# Patient Record
Sex: FEMALE | Race: WHITE
[De-identification: names, ages, dates, MRNs, and addresses within clinical notes are randomized per-mention and may not be internally consistent; named-entity substitution may affect disease eponyms.]

---

## 2020-06-28 ENCOUNTER — HOSPITAL ENCOUNTER (EMERGENCY)
Dept: HOSPITAL 11 - JP.ED | Age: 40
Discharge: HOME | End: 2020-06-28
Payer: SELF-PAY

## 2020-06-28 DIAGNOSIS — Z88.5: ICD-10-CM

## 2020-06-28 DIAGNOSIS — F41.9: Primary | ICD-10-CM

## 2020-06-28 DIAGNOSIS — F17.210: ICD-10-CM

## 2020-06-28 DIAGNOSIS — Z79.899: ICD-10-CM

## 2020-06-28 NOTE — EDM.PDOC
ED HPI GENERAL MEDICAL PROBLEM





- General


Chief Complaint: General


Stated Complaint: PANIC ATTACKS


Time Seen by Provider: 06/28/20 14:21


Source of Information: Reports: Patient


History Limitations: Reports: No Limitations





- History of Present Illness


INITIAL COMMENTS - FREE TEXT/NARRATIVE: 





This patient who has a history of panic attacks and had a full work up in [facility] in the recent past presents to the ER with a panicky feeling, throat 

constriction, dry mouth and "rushes" since 0930 today.  She had similar attacks 

in the past and treated them with breathing exercises and prn hydroxizine.  She 

ran out of this medication and is in requesting more.  She denies fever, chills,

chest pain, dyspnea or any new neuro symptoms.  She and her family recently 

moved here from [place], [place] and has not found a primary care doctor in the 

area.  


  ** Chest


Pain Score (Numeric/FACES): 5





- Related Data


                                    Allergies











Allergy/AdvReac Type Severity Reaction Status Date / Time


 


hydromorphone [From Dilaudid] Allergy  Shortness Verified 06/28/20 14:13





   of Breath  











Home Meds: 


                                    Home Meds





hydrOXYzine HCL [hydrOXYzine] 25 mg PO Q6H PRN 06/28/20 [History]











Past Medical History


Psychiatric History: Reports: Anxiety





- Past Surgical History


HEENT Surgical History: Reports: Tonsillectomy





Social & Family History





- Tobacco Use


Smoking Status *Q: Light Tobacco Smoker


Years of Tobacco use: 29


Packs/Tins Daily: 0.1





- Caffeine Use


Caffeine Use: Reports: None





- Recreational Drug Use


Recreational Drug Use: No





ED ROS GENERAL





- Review of Systems


Review Of Systems: See Below


Constitutional: Denies: Fever, Chills, Weakness, Fatigue, Diaphoresis


HEENT: Reports: Other (throat constriction)


Respiratory: Denies: Shortness of Breath, Wheezing, Cough


Cardiovascular: Denies: Chest Pain, Dyspnea on Exertion, Palpitations


Endocrine: Denies: Polydypsia, Polyuria


GI/Abdominal: Reports: No Symptoms


Neurological: Denies: Confusion, Dizziness, Paresthesia, Syncope


Psychiatric: Reports: Anxiety.  Denies: Homicidal Ideation, Suicidal Ideation


Hematologic/Lymphatic: Reports: No Symptoms





ED EXAM, GENERAL





- Physical Exam


Exam: See Below


Exam Limited By: No Limitations


General Appearance: Alert, WD/WN, No Apparent Distress, Anxious


Ears: Normal External Exam, Normal Canal


Throat/Mouth: Normal Inspection, Normal Lips


Head: Normocephalic


Respiratory/Chest: No Respiratory Distress, Lungs Clear, Normal Breath Sounds


Cardiovascular: Normal Peripheral Pulses, Regular Rate, Rhythm, No Edema


GI/Abdominal: Normal Bowel Sounds, Soft, Non-Tender


Extremities: Normal Inspection, Normal Range of Motion


Neurological: Alert, Oriented, Normal Cognition, No Motor/Sensory Deficits


Psychiatric: Normal Affect, Anxious


Skin Exam: Warm, Dry





Course





- Vital Signs


Text/Narrative:: 





This patient presents with a panic attack.  She had similar problems in the past

 that respond to hydroxizine which she ran out of .  She has not established 

care in this area after recently moving to [place] from [place], [place].  She 

was given an Rx for hydroxyzine 25 mg every 6 hours as needed.  She will make an

 appointment at the clinic in the near future.  


Last Recorded V/S: 


                                Last Vital Signs











Temp  37.0 C   06/28/20 14:10


 


Pulse  86   06/28/20 14:10


 


Resp  17   06/28/20 14:10


 


BP  141/87 H  06/28/20 14:10


 


Pulse Ox  99   06/28/20 14:10














- Orders/Labs/Meds


Orders: 


                               Active Orders 24 hr











 Category Date Time Status


 


 OB 1st Tri NT Measure [US] Stat Exams  06/28/20 14:53 Stop Req











Meds: 


Medications














Discontinued Medications














Generic Name Dose Route Start Last Admin





  Trade Name Freq  PRN Reason Stop Dose Admin


 


Sodium Chloride  1,000 mls @ 1,000 mls/hr  06/28/20 15:00 





  Normal Saline  IV  





  ASDIRECTED SHREYA  














Departure





- Departure


Time of Disposition: 15:05


Disposition: Home, Self-Care 01


Condition: Good


Clinical Impression: 


 Anxiety








- Discharge Information


*PRESCRIPTION DRUG MONITORING PROGRAM REVIEWED*: Yes


*COPY OF PRESCRIPTION DRUG MONITORING REPORT IN PATIENT AL: No


Referrals: 


PCP,None [Primary Care Provider] - 


Forms:  ED Department Discharge


Additional Instructions: 


Take hydroxyzine as prescribed and as needed for anxiety and panic attacks.  

Make an appointment with a primary care provider for follow up in the near 

future.


Return to the ER if you have problems or concerns.  





Sepsis Event Note (ED)





- Evaluation


Sepsis Screening Result: No Definite Risk





- Focused Exam


Vital Signs: 


                                   Vital Signs











  Temp Pulse Resp BP Pulse Ox


 


 06/28/20 14:10  37.0 C  86  17  141/87 H  99


 


 06/28/20 14:04  37.0 C  86  17  141/87 H  99














- My Orders


Last 24 Hours: 


My Active Orders





06/28/20 14:53


OB 1st Tri NT Measure [US] Stat 














- Assessment/Plan


Last 24 Hours: 


My Active Orders





06/28/20 14:53


OB 1st Tri NT Measure [US] Stat

## 2020-07-30 ENCOUNTER — HOSPITAL ENCOUNTER (EMERGENCY)
Dept: HOSPITAL 11 - JP.ED | Age: 40
Discharge: HOME | End: 2020-07-30
Payer: COMMERCIAL

## 2020-07-30 DIAGNOSIS — F17.210: ICD-10-CM

## 2020-07-30 DIAGNOSIS — Z88.5: ICD-10-CM

## 2020-07-30 DIAGNOSIS — K80.20: Primary | ICD-10-CM

## 2020-07-30 PROCEDURE — 83605 ASSAY OF LACTIC ACID: CPT

## 2020-07-30 PROCEDURE — 96374 THER/PROPH/DIAG INJ IV PUSH: CPT

## 2020-07-30 PROCEDURE — 96375 TX/PRO/DX INJ NEW DRUG ADDON: CPT

## 2020-07-30 PROCEDURE — 96376 TX/PRO/DX INJ SAME DRUG ADON: CPT

## 2020-07-30 PROCEDURE — 81025 URINE PREGNANCY TEST: CPT

## 2020-07-30 PROCEDURE — 99284 EMERGENCY DEPT VISIT MOD MDM: CPT

## 2020-07-30 PROCEDURE — 84484 ASSAY OF TROPONIN QUANT: CPT

## 2020-07-30 PROCEDURE — 81001 URINALYSIS AUTO W/SCOPE: CPT

## 2020-07-30 PROCEDURE — 76705 ECHO EXAM OF ABDOMEN: CPT

## 2020-07-30 PROCEDURE — 87086 URINE CULTURE/COLONY COUNT: CPT

## 2020-07-30 PROCEDURE — 96361 HYDRATE IV INFUSION ADD-ON: CPT

## 2020-07-30 PROCEDURE — 85025 COMPLETE CBC W/AUTO DIFF WBC: CPT

## 2020-07-30 PROCEDURE — 80053 COMPREHEN METABOLIC PANEL: CPT

## 2020-07-30 PROCEDURE — 74177 CT ABD & PELVIS W/CONTRAST: CPT

## 2020-07-30 PROCEDURE — 83690 ASSAY OF LIPASE: CPT

## 2020-07-30 PROCEDURE — 36415 COLL VENOUS BLD VENIPUNCTURE: CPT

## 2020-07-30 NOTE — EDM.PDOC
ED HPI GENERAL MEDICAL PROBLEM





- General


Chief Complaint: Gastrointestinal Problem


Stated Complaint: BACK PAIN


Time Seen by Provider: 07/30/20 02:06


Source of Information: Reports: Patient, Family, RN Notes Reviewed


History Limitations: Reports: No Limitations





- History of Present Illness


INITIAL COMMENTS - FREE TEXT/NARRATIVE: 





40-year-old female presents emergency department a complaint of epigastric pain,

she states the pain started early this morning she did try eating some spaghetti

and meatballs around noon however had emesis has felt ill the remainder of the 

day unable to eat or drink no history of abdominal surgeries


  ** Middle Back


Pain Score (Numeric/FACES): 9





- Related Data


                                    Allergies











Allergy/AdvReac Type Severity Reaction Status Date / Time


 


hydromorphone [From Dilaudid] Allergy  Shortness Verified 07/30/20 01:57





   of Breath  











Home Meds: 


                                    Home Meds





hydrOXYzine HCL [hydrOXYzine] 25 mg PO Q6H PRN 06/28/20 [History]











Past Medical History


Psychiatric History: Reports: Anxiety





- Infectious Disease History


Infectious Disease History: Reports: Chicken Pox





- Past Surgical History


HEENT Surgical History: Reports: Tonsillectomy





Social & Family History





- Tobacco Use


Smoking Status *Q: Current Every Day Smoker


Years of Tobacco use: 29


Packs/Tins Daily: 0.2





- Caffeine Use


Caffeine Use: Reports: None





- Recreational Drug Use


Recreational Drug Use: No





ED ROS GENERAL





- Review of Systems


Review Of Systems: See Below


Constitutional: Reports: Diaphoresis


HEENT: Reports: No Symptoms


Respiratory: Reports: No Symptoms


Cardiovascular: Reports: No Symptoms


GI/Abdominal: Reports: Abdominal Pain (Epigastric region), Nausea, Vomiting


: Reports: No Symptoms


Musculoskeletal: Reports: No Symptoms


Skin: Reports: No Symptoms


Neurological: Reports: No Symptoms





ED EXAM, GI/ABD





- Physical Exam


Exam: See Below


Exam Limited By: No Limitations


General Appearance: Alert, WD/WN, Moderate Distress


Respiratory/Chest: No Respiratory Distress, Lungs Clear, Normal Breath Sounds, 

No Accessory Muscle Use, Chest Non-Tender


Cardiovascular: Regular Rate, Rhythm, No Murmur


GI/Abdominal Exam: Soft, No Organomegaly, No Distention, Tender


Back Exam: Normal Inspection, Full Range of Motion.  No: CVA Tenderness (R), CVA

 Tenderness (L), Muscle Spasm, Paraspinal Tenderness, Vertebral Tenderness


Extremities: No Pedal Edema





Course





- Vital Signs


Last Recorded V/S: 


                                Last Vital Signs











Temp  96.8 F L  07/30/20 01:58


 


Pulse  53 L  07/30/20 05:37


 


Resp  18   07/30/20 01:58


 


BP  113/56 L  07/30/20 05:37


 


Pulse Ox  100   07/30/20 01:58














- Orders/Labs/Meds


Orders: 


                               Active Orders 24 hr











 Category Date Time Status


 


 Peripheral IV Care [RC] .AS DIRECTED Care  07/30/20 02:11 Active


 


 CULTURE URINE [RM] Urgent Lab  07/30/20 06:04 Ordered


 


 Sodium Chloride 0.9% [Normal Saline] 1,000 ml Med  07/30/20 02:15 Active





 IV ASDIRECTED   


 


 Sodium Chloride 0.9% [Saline Flush] Med  07/30/20 02:10 Active





 10 ml FLUSH ASDIRECTED PRN   


 


 Peripheral IV Insertion Adult [OM.PC] Urgent Oth  07/30/20 02:10 Ordered








                                Medication Orders





Sodium Chloride (Normal Saline)  1,000 mls @ 500 mls/hr IV ASDIRECTED SHREYA


   Last Admin: 07/30/20 02:33  Dose: 500 mls/hr


   Documented by: ILDEFONSO


   Infusion: 07/30/20 02:33  Dose: 500 mls/hr


   Documented by: ILDEFONSO


   Admin: 07/30/20 02:30  Dose: 500 mls/hr


   Documented by: ILDEFONSO


Sodium Chloride (Saline Flush)  10 ml FLUSH ASDIRECTED PRN


   PRN Reason: Keep Vein Open


   Last Admin: 07/30/20 02:31  Dose: 10 ml


   Documented by: ILDEFONSO








Labs: 


                                Laboratory Tests











  07/30/20 07/30/20 07/30/20 Range/Units





  02:25 02:25 02:25 


 


WBC  8.7    (4.5-11.0)  K/uL


 


RBC  5.08    (3.30-5.50)  M/uL


 


Hgb  14.9    (12.0-15.0)  g/dL


 


Hct  45.1    (36.0-48.0)  %


 


MCV  89    (80-98)  fL


 


MCH  29    (27-31)  pg


 


MCHC  33    (32-36)  %


 


Plt Count  409 H    (150-400)  K/uL


 


Neut % (Auto)  72 H    (36-66)  %


 


Lymph % (Auto)  19 L    (24-44)  %


 


Mono % (Auto)  8 H    (2-6)  %


 


Eos % (Auto)  0 L    (2-4)  %


 


Baso % (Auto)  1    (0-1)  %


 


Sodium   139 L   (140-148)  mmol/L


 


Potassium   4.1   (3.6-5.2)  mmol/L


 


Chloride   102   (100-108)  mmol/L


 


Carbon Dioxide   30   (21-32)  mmol/L


 


Anion Gap   11.1   (5.0-14.0)  mmol/L


 


BUN   15   (7-18)  mg/dL


 


Creatinine   1.2 H   (0.6-1.0)  mg/dL


 


Est Cr Clr Drug Dosing   52.58   mL/min


 


Estimated GFR (MDRD)   50 L   (>60)  


 


Glucose   148 H   ()  mg/dL


 


Lactic Acid    1.6  (0.4-2.0)  mmol/L


 


Calcium   8.8   (8.5-10.1)  mg/dL


 


Total Bilirubin   1.4 H   (0.2-1.0)  mg/dL


 


AST   424 H   (15-37)  U/L


 


ALT   176 H   (12-78)  U/L


 


Alkaline Phosphatase   105   ()  U/L


 


Troponin I   < 0.017   (0.000-0.056)  ng/mL


 


Total Protein   8.3 H   (6.4-8.2)  g/dL


 


Albumin   3.8   (3.4-5.0)  g/dL


 


Globulin   4.5 H   (2.3-3.5)  g/dL


 


Albumin/Globulin Ratio   0.8 L   (1.2-2.2)  


 


Lipase   268   ()  U/L


 


Urine Color     (YELLOW)  


 


Urine Appearance     (CLEAR)  


 


Urine pH     (5.0-8.0)  


 


Ur Specific Gravity     (1.008-1.030)  


 


Urine Protein     (NEGATIVE)  mg/dL


 


Urine Glucose (UA)     (NEGATIVE)  mg/dL


 


Urine Ketones     (NEGATIVE)  mg/dL


 


Urine Occult Blood     (NEGATIVE)  


 


Urine Nitrite     (NEGATIVE)  


 


Urine Bilirubin     (NEGATIVE)  


 


Urine Urobilinogen     (0.2-1.0)  EU/dL


 


Ur Leukocyte Esterase     (NEGATIVE)  


 


Urine RBC     (0-5)  


 


Urine WBC     (0-5)  


 


Ur Epithelial Cells     


 


Amorphous Sediment     


 


Urine Bacteria     


 


Urine Mucus     


 


Urine HCG, Qual     














  07/30/20 07/30/20 Range/Units





  02:40 02:40 


 


WBC    (4.5-11.0)  K/uL


 


RBC    (3.30-5.50)  M/uL


 


Hgb    (12.0-15.0)  g/dL


 


Hct    (36.0-48.0)  %


 


MCV    (80-98)  fL


 


MCH    (27-31)  pg


 


MCHC    (32-36)  %


 


Plt Count    (150-400)  K/uL


 


Neut % (Auto)    (36-66)  %


 


Lymph % (Auto)    (24-44)  %


 


Mono % (Auto)    (2-6)  %


 


Eos % (Auto)    (2-4)  %


 


Baso % (Auto)    (0-1)  %


 


Sodium    (140-148)  mmol/L


 


Potassium    (3.6-5.2)  mmol/L


 


Chloride    (100-108)  mmol/L


 


Carbon Dioxide    (21-32)  mmol/L


 


Anion Gap    (5.0-14.0)  mmol/L


 


BUN    (7-18)  mg/dL


 


Creatinine    (0.6-1.0)  mg/dL


 


Est Cr Clr Drug Dosing    mL/min


 


Estimated GFR (MDRD)    (>60)  


 


Glucose    ()  mg/dL


 


Lactic Acid    (0.4-2.0)  mmol/L


 


Calcium    (8.5-10.1)  mg/dL


 


Total Bilirubin    (0.2-1.0)  mg/dL


 


AST    (15-37)  U/L


 


ALT    (12-78)  U/L


 


Alkaline Phosphatase    ()  U/L


 


Troponin I    (0.000-0.056)  ng/mL


 


Total Protein    (6.4-8.2)  g/dL


 


Albumin    (3.4-5.0)  g/dL


 


Globulin    (2.3-3.5)  g/dL


 


Albumin/Globulin Ratio    (1.2-2.2)  


 


Lipase    ()  U/L


 


Urine Color  Yellow   (YELLOW)  


 


Urine Appearance  Slightly cloudy A   (CLEAR)  


 


Urine pH  7.0   (5.0-8.0)  


 


Ur Specific Gravity  1.025   (1.008-1.030)  


 


Urine Protein  Negative   (NEGATIVE)  mg/dL


 


Urine Glucose (UA)  Negative   (NEGATIVE)  mg/dL


 


Urine Ketones  Negative   (NEGATIVE)  mg/dL


 


Urine Occult Blood  Negative   (NEGATIVE)  


 


Urine Nitrite  Negative   (NEGATIVE)  


 


Urine Bilirubin  Small H   (NEGATIVE)  


 


Urine Urobilinogen  2.0 H   (0.2-1.0)  EU/dL


 


Ur Leukocyte Esterase  Trace H   (NEGATIVE)  


 


Urine RBC  0-5   (0-5)  


 


Urine WBC  5-10 H   (0-5)  


 


Ur Epithelial Cells  Moderate   


 


Amorphous Sediment  Moderate   


 


Urine Bacteria  Many   


 


Urine Mucus  Few   


 


Urine HCG, Qual   Negative  











Meds: 


Medications











Generic Name Dose Route Start Last Admin





  Trade Name Freq  PRN Reason Stop Dose Admin


 


Sodium Chloride  1,000 mls @ 500 mls/hr  07/30/20 02:15  07/30/20 02:33





  Normal Saline  IV   500 mls/hr





  ASDIRECTED SHREYA   Administration


 


Sodium Chloride  10 ml  07/30/20 02:10  07/30/20 02:31





  Saline Flush  FLUSH   10 ml





  ASDIRECTED PRN   Administration





  Keep Vein Open  














Discontinued Medications














Generic Name Dose Route Start Last Admin





  Trade Name Freq  PRN Reason Stop Dose Admin


 


Fentanyl  50 mcg  07/30/20 02:14  07/30/20 02:30





  Sublimaze  IVPUSH  07/30/20 02:15  50 mcg





  ONETIME ONE   Administration


 


Fentanyl  100 mcg  07/30/20 04:10  07/30/20 04:18





  Sublimaze  IVPUSH  07/30/20 04:11  100 mcg





  ONETIME ONE   Administration


 


Sodium Chloride  85 mls @ 4 mls/sec  07/30/20 02:30  07/30/20 02:57





  Normal Saline  IV  07/30/20 02:31  4 mls/sec





  ASDIRECTED STA   Administration


 


Iopamidol  150 ml  07/30/20 02:30  07/30/20 02:57





  Isovue-300 (61%)  IV  07/30/20 02:31  150 ml





  .AS DIRECTED STA   Administration


 


Ketorolac Tromethamine  30 mg  07/30/20 03:13  07/30/20 03:17





  Toradol  IVPUSH  07/30/20 03:14  30 mg





  ONETIME ONE   Administration


 


Ondansetron HCl  4 mg  07/30/20 02:14  07/30/20 02:30





  Zofran  IVPUSH  07/30/20 02:15  4 mg





  ONETIME ONE   Administration














Departure





- Departure


Time of Disposition: 06:18


Disposition: Home, Self-Care 01


Condition: Fair


Clinical Impression: 


Cholelithiasis


Qualifiers:


 Cholelithiasis location: gallbladder Cholecystitis presence: without 

cholecystitis Biliary obstruction: without biliary obstruction Qualified 

Code(s): K80.20 - Calculus of gallbladder without cholecystitis without 

obstruction








- Discharge Information


Instructions:  Cholelithiasis


Referrals: 


PCP,None [Primary Care Provider] - 


Forms:  ED Department Discharge


Additional Instructions: 


Recommend bland diet, use hydrocodone as needed for pain control, use Zofran as 

needed for nausea and vomiting symptoms, please call to the [facility] in 

the morning for an appointment time with Dr. Pollard for further evaluation from 

general surgery





Sepsis Event Note (ED)





- Evaluation


Sepsis Screening Result: No Definite Risk





- Focused Exam


Vital Signs: 


                                   Vital Signs











  Temp Pulse Resp BP Pulse Ox


 


 07/30/20 05:37   53 L   113/56 L 


 


 07/30/20 04:30   65   130/68 


 


 07/30/20 03:45   85   140/78 


 


 07/30/20 01:58  96.8 F L  99  18  143/80 H  100














- My Orders


Last 24 Hours: 


My Active Orders





07/30/20 02:10


Sodium Chloride 0.9% [Saline Flush]   10 ml FLUSH ASDIRECTED PRN 


Peripheral IV Insertion Adult [OM.PC] Urgent 





07/30/20 02:11


Peripheral IV Care [RC] .AS DIRECTED 





07/30/20 02:15


Sodium Chloride 0.9% [Normal Saline] 1,000 ml IV ASDIRECTED 





07/30/20 06:04


CULTURE URINE [RM] Urgent 














- Assessment/Plan


Last 24 Hours: 


My Active Orders





07/30/20 02:10


Sodium Chloride 0.9% [Saline Flush]   10 ml FLUSH ASDIRECTED PRN 


Peripheral IV Insertion Adult [OM.PC] Urgent 





07/30/20 02:11


Peripheral IV Care [RC] .AS DIRECTED 





07/30/20 02:15


Sodium Chloride 0.9% [Normal Saline] 1,000 ml IV ASDIRECTED 





07/30/20 06:04


CULTURE URINE [RM] Urgent 











Plan: 





Assessment





Acuity = acute





Site and laterality = cholelithiasis





Etiology  = unknown





Manifestations = epigastric abdominal pain





Location of injury =  Home





Lab values = CBC unremarkable creatinine elevated 1.2 consistent chronic renal 

failure stage G3 a lactic acid normal 1.6 total bilirubin elevated 1.4 consisten

t with hyperbilirubinemia   consistent with elevated liver enzymes

 troponin is negative CT scan does show multiple stones and fatty liver disease,

 ultrasound confirms multiple stones with no sign of cholecystitis





Plan


Did review lab work and CT image study and ultrasound of her she is pain-free at

 this time plan to discharge home hydrocodone 5/325 1 tab p.o. 3 times daily PRN

 total #10 Zofran 4 mg ODT 1 tab p.o. 3 times daily PRN total #10 consultation 

has been set up with Dr. Pollard general surgery within the next couple of days

















 This note was dictated using dragon voice recognition software please call with

 any questions on syntax or grammar.

## 2020-07-30 NOTE — CRLUS
INDICATION:



Abdominal pain, elevated LFTs



TECHNIQUE:



Ultrasound abdomen limited.  Sonographic images of the right upper quadrant 

were obtained using gray-scale and color Doppler images.



COMPARISON:



None



FINDINGS:



Liver: Normal in size diffuse fatty infiltration. No masses.  No 

intrahepatic biliary dilatation.  



Gallbladder: Cholelithiasis. Normal wall thickness.  No pericholecystic 

fluid.  



Common bile duct: 4 mm. 



Pancreas: Normal.  



Right kidney: 10 point a cm.  Normal echotexture and cortex.  No masses, 

stones, or hydronephrosis.  



Vasculature: Proximal abdominal aorta and IVC are normal.  



IMPRESSION:



Multiple gallstones in an otherwise normal-appearing gallbladder. 



Diffuse fatty infiltration of the liver.



Dictated by Washington Willson MD @ 7/30/2020 5:58:46 AM



Dictated by: Washington Willson MD @ 07/30/2020 05:58:52



(Electronically Signed)

## 2020-07-30 NOTE — CRLCT
INDICATION:



Epigastric pain 



TECHNIQUE:



CT abdomen and pelvis acquired with IV contrast. 150 cc Isovue-300 



COMPARISON:



None 



FINDINGS:



Lower chest: Unremarkable.  



Liver: Unremarkable.  



Spleen: Unremarkable.  



Pancreas: Unremarkable.  



Gallbladder and bile ducts: Cholelithiasis. 



Kidneys: Unremarkable.  



Adrenal glands: Unremarkable.  



GI tract: Diffuse colonic fecal retention mildly distended stomach. 

Appendix is normal.  



Vascular structures: Unremarkable.  



Lymph nodes: Unremarkable.  



Miscellaneous: Unremarkable.  No free air or significant free fluid.  



Pelvic Organs: Unremarkable.  



Bones: Unremarkable for age.  



IMPRESSION:



Diffuse colonic fecal retention mildly distended stomach with debris. The 

remainder of the exam is essentially unremarkable.



Dictated by Washington Willson MD @ 7/30/2020 4:00:35 AM



Please note that all CT scans at this facility use dose modulation, 

iterative reconstruction, and/or weight-based dosing when appropriate to 

reduce radiation dose to as low as reasonably achievable.



Dictated by: Washington Willson MD @ 07/30/2020 04:00:43



(Electronically Signed)

## 2020-08-12 ENCOUNTER — HOSPITAL ENCOUNTER (INPATIENT)
Dept: HOSPITAL 11 - JP.MS | Age: 40
LOS: 3 days | Discharge: HOME | DRG: 416 | End: 2020-08-15
Attending: SURGERY | Admitting: SURGERY
Payer: MEDICAID

## 2020-08-12 DIAGNOSIS — F17.200: ICD-10-CM

## 2020-08-12 DIAGNOSIS — Z11.59: ICD-10-CM

## 2020-08-12 DIAGNOSIS — F41.9: ICD-10-CM

## 2020-08-12 DIAGNOSIS — Z88.5: ICD-10-CM

## 2020-08-12 DIAGNOSIS — K80.12: Primary | ICD-10-CM

## 2020-08-12 DIAGNOSIS — K21.9: ICD-10-CM

## 2020-08-12 PROCEDURE — U0002 COVID-19 LAB TEST NON-CDC: HCPCS

## 2020-08-12 PROCEDURE — 0DQ94ZZ REPAIR DUODENUM, PERCUTANEOUS ENDOSCOPIC APPROACH: ICD-10-PCS | Performed by: SURGERY

## 2020-08-12 PROCEDURE — 0FT40ZZ RESECTION OF GALLBLADDER, OPEN APPROACH: ICD-10-PCS | Performed by: SURGERY

## 2020-08-12 PROCEDURE — C9113 INJ PANTOPRAZOLE SODIUM, VIA: HCPCS

## 2020-08-12 RX ADMIN — AMPICILLIN SODIUM AND SULBACTAM SODIUM SCH MLS/HR: 2; 1 INJECTION, POWDER, FOR SOLUTION INTRAMUSCULAR; INTRAVENOUS at 18:16

## 2020-08-12 RX ADMIN — AMPICILLIN SODIUM AND SULBACTAM SODIUM SCH MLS/HR: 2; 1 INJECTION, POWDER, FOR SOLUTION INTRAMUSCULAR; INTRAVENOUS at 13:13

## 2020-08-13 RX ADMIN — AMPICILLIN SODIUM AND SULBACTAM SODIUM SCH MLS/HR: 2; 1 INJECTION, POWDER, FOR SOLUTION INTRAMUSCULAR; INTRAVENOUS at 13:29

## 2020-08-13 RX ADMIN — AMPICILLIN SODIUM AND SULBACTAM SODIUM SCH MLS/HR: 2; 1 INJECTION, POWDER, FOR SOLUTION INTRAMUSCULAR; INTRAVENOUS at 07:44

## 2020-08-13 RX ADMIN — AMPICILLIN SODIUM AND SULBACTAM SODIUM SCH MLS/HR: 2; 1 INJECTION, POWDER, FOR SOLUTION INTRAMUSCULAR; INTRAVENOUS at 00:40

## 2020-08-13 RX ADMIN — AMPICILLIN SODIUM AND SULBACTAM SODIUM SCH MLS/HR: 2; 1 INJECTION, POWDER, FOR SOLUTION INTRAMUSCULAR; INTRAVENOUS at 19:25

## 2020-08-13 RX ADMIN — HYDROCODONE BITARTRATE AND ACETAMINOPHEN PRN TAB: 5; 325 TABLET ORAL at 21:12

## 2020-08-13 NOTE — PCM.HP.2
H&P History of Present Illness





- General


Date of Service: 08/13/20


Admit Problem/Dx: 


                           Admission Diagnosis/Problem





Admission Diagnosis/Problem      Acute cholecystitis








Source of Information: Patient


History Limitations: Reports: No Limitations





- History of Present Illness


Initial Comments - Free Text/Narative: 





Yari states that she has had problems with right upper quadrant abdominal 

pain, bloating and nausea for about 1 month. 


The pain increased on 7/30/2020 after eating spaghetti and meatballs that she 

went to the ED. 


After evaluation of elevated LFTs an US revealed multiple gallstones in her 

gallbladder. 


Yari saw Leonel Pollard MD in the clinic yesterday and was a direct admit to the

hospital for dehydration and pain control. 


She is NPO for a Laparoscopic Cholecystectomy today, 8/14/2020 - Case to Follow.







- Related Data


Allergies/Adverse Reactions: 


                                    Allergies











Allergy/AdvReac Type Severity Reaction Status Date / Time


 


hydromorphone [From Dilaudid] Allergy  Shortness Verified 07/30/20 01:57





   of Breath  











Home Medications: 


                                    Home Meds





hydrOXYzine HCL [hydrOXYzine] 25 mg PO Q6H PRN 06/28/20 [History]











Past Medical History


Gastrointestinal History: Reports: GERD, Hiatal Hernia


Psychiatric History: Reports: Anxiety





- Infectious Disease History


Infectious Disease History: Reports: Chicken Pox





- Past Surgical History


HEENT Surgical History: Reports: Tonsillectomy


GI Surgical History: Reports: None





Social & Family History





- Family History


Family Medical History: Noncontributory





- Tobacco Use


Smoking Status *Q: Current Every Day Smoker


Years of Tobacco use: 25


Packs/Tins Daily: 0.5


Second Hand Smoke Exposure: Yes





- Caffeine Use


Caffeine Use: Reports: None





- Recreational Drug Use


Recreational Drug Use: No





H&P Review of Systems





- Review of Systems:


Review Of Systems: Comprehensive ROS is negative, except as noted in HPI.





Exam





- Exam


Exam: See Below





- Vital Signs


Vital Signs: 


                                Last Vital Signs











Temp  98.2 F   08/13/20 04:00


 


Pulse  61   08/12/20 21:00


 


Resp  16   08/13/20 04:00


 


BP  126/70   08/13/20 04:00


 


Pulse Ox  98   08/13/20 04:00











Weight: 272 lb 6.4 oz





- Exam


Quality Assessment: DVT Prophylaxis


General: Alert, Oriented, Mild Distress


HEENT: PERRLA, Other (condition of teeth poor)


Neck: Supple, Trachea Midline


Lungs: Clear to Auscultation, Normal Respiratory Effort


Cardiovascular: Regular Rate, Regular Rhythm


GI/Abdominal Exam: Soft, Other (tenderness in the right upper abdominal 

quadrant)


 (Female) Exam: Deferred


Rectal (Female) Exam: Deferred


Back Exam: Normal Inspection, Full Range of Motion


Extremities: Normal Inspection, No Pedal Edema


Skin: Warm, Dry, Intact


Neurological: Cranial Nerves Intact, Reflexes Equal Bilateral


Neuro Extensive - Mental Status: Alert, Oriented x3, Normal Mood/Affect, Memory 

Intact


Neuro Extensive - Motor, Sensory, Reflexes: CN II-XII Intact


Psychiatric: Alert, Normal Affect, Normal Mood





- Patient Data


Lab Results Last 24 hrs: 


                         Laboratory Results - last 24 hr











  08/12/20 08/12/20 08/12/20 Range/Units





  12:56 12:56 12:56 


 


WBC  7.3    (4.5-11.0)  K/uL


 


RBC  4.94    (3.30-5.50)  M/uL


 


Hgb  14.1    (12.0-15.0)  g/dL


 


Hct  44.1    (36.0-48.0)  %


 


MCV  89    (80-98)  fL


 


MCH  29    (27-31)  pg


 


MCHC  32    (32-36)  %


 


Plt Count  396    (150-400)  K/uL


 


Sodium   138 L   (140-148)  mmol/L


 


Potassium   4.6   (3.6-5.2)  mmol/L


 


Chloride   104   (100-108)  mmol/L


 


Carbon Dioxide   29   (21-32)  mmol/L


 


Anion Gap   9.6   (5.0-14.0)  mmol/L


 


BUN   14   (7-18)  mg/dL


 


Creatinine   1.1 H   (0.6-1.0)  mg/dL


 


Est Cr Clr Drug Dosing   57.36   mL/min


 


Estimated GFR (MDRD)   56 L   (>60)  


 


Glucose   90   ()  mg/dL


 


Calcium   8.7   (8.5-10.1)  mg/dL


 


Phosphorus    2.7  (2.5-4.9)  mg/dL


 


Magnesium   2.1   (1.8-2.4)  mg/dL


 


Total Bilirubin   0.3  D   (0.2-1.0)  mg/dL


 


AST   16  D   (15-37)  U/L


 


ALT   23  D   (12-78)  U/L


 


Alkaline Phosphatase   69   ()  U/L


 


Total Protein   7.6   (6.4-8.2)  g/dL


 


Albumin   3.5   (3.4-5.0)  g/dL


 


Globulin   4.1 H   (2.3-3.5)  g/dL


 


Albumin/Globulin Ratio   0.9 L   (1.2-2.2)  


 


SARS Virus RNA (PCR)     (NEGATIVE)  














  08/12/20 Range/Units





  13:38 


 


WBC   (4.5-11.0)  K/uL


 


RBC   (3.30-5.50)  M/uL


 


Hgb   (12.0-15.0)  g/dL


 


Hct   (36.0-48.0)  %


 


MCV   (80-98)  fL


 


MCH   (27-31)  pg


 


MCHC   (32-36)  %


 


Plt Count   (150-400)  K/uL


 


Sodium   (140-148)  mmol/L


 


Potassium   (3.6-5.2)  mmol/L


 


Chloride   (100-108)  mmol/L


 


Carbon Dioxide   (21-32)  mmol/L


 


Anion Gap   (5.0-14.0)  mmol/L


 


BUN   (7-18)  mg/dL


 


Creatinine   (0.6-1.0)  mg/dL


 


Est Cr Clr Drug Dosing   mL/min


 


Estimated GFR (MDRD)   (>60)  


 


Glucose   ()  mg/dL


 


Calcium   (8.5-10.1)  mg/dL


 


Phosphorus   (2.5-4.9)  mg/dL


 


Magnesium   (1.8-2.4)  mg/dL


 


Total Bilirubin   (0.2-1.0)  mg/dL


 


AST   (15-37)  U/L


 


ALT   (12-78)  U/L


 


Alkaline Phosphatase   ()  U/L


 


Total Protein   (6.4-8.2)  g/dL


 


Albumin   (3.4-5.0)  g/dL


 


Globulin   (2.3-3.5)  g/dL


 


Albumin/Globulin Ratio   (1.2-2.2)  


 


SARS Virus RNA (PCR)  Negative  (NEGATIVE)  











Result Diagrams: 


                                 08/12/20 12:56





                                 08/12/20 12:56





Sepsis Event Note





- Evaluation


Sepsis Screening Result: No Definite Risk





- Focused Exam


Vital Signs: 


                                   Vital Signs











  Temp Pulse Resp BP Pulse Ox


 


 08/13/20 04:00  98.2 F   16  126/70  98


 


 08/12/20 21:00  98.2 F  61  16  140/84  100














- Problem List


(1) Cholelithiasis


SNOMED Code(s): 232675503


   ICD Code: K80.20 - CALCULUS OF GALLBLADDER W/O CHOLECYSTITIS W/O OBSTRUCTION 

  Status: Acute   Current Visit: No   


Qualifiers: 


   Cholelithiasis location: gallbladder   Cholecystitis presence: with 

cholecystitis   Biliary obstruction: without biliary obstruction   Qualified 

Code(s): K80.20 - Calculus of gallbladder without cholecystitis without 

obstruction   


Problem List Initiated/Reviewed/Updated: Yes


Orders Last 24hrs: 


                               Active Orders 24 hr











 Category Date Time Status


 


 Admission Status [Patient Status] [ADT] Routine ADT  08/12/20 12:00 Active


 


 Ambulate [RC] QID Care  08/12/20 12:22 Active


 


 Incentive Breathing [RT Incentive Spirometry] [RC] Care  08/12/20 12:23 Active





 Q1HWA   


 


 Intake and Output [RC] QSHIFT Care  08/12/20 12:25 Active


 


 Up to Chair [RC] QID Care  08/12/20 12:22 Active


 


 Verify Patient Consent Obtain [RC] ASDIRECTED Care  08/13/20 10:30 Active


 


 Vital Signs [RC] Q4H Care  08/12/20 12:22 Active


 


 Clear Liquid Diet [DIET] Diet  08/12/20 Dinner Active


 


 NPO After Midnight [Nothing per Oral After Midnight Diet  08/12/20 Dinner 

Active





 Diet] [DIET]   


 


 Ampicillin/Sulbactam Na [Unasyn] 3 gm Med  08/12/20 13:00 Active





 Sodium Chloride 0.9% [Normal Saline] 100 ml   





 IV Q6H   


 


 Aztreonam [Azactam] 1 gm Med  08/12/20 14:00 Active





 Sodium Chloride 0.9% [Normal Saline] 50 ml   





 IV Q8H   


 


 Dextrose 5%-Lactated Ringers 1,000 ml Med  08/12/20 17:30 Active





 IV ASDIRECTED   


 


 Ketamine [Ketalar] Med  08/13/20 11:00 Active





 26 mg IV ASDIRECTED   


 


 Ketamine [Ketalar] 50 mg Med  08/13/20 11:00 Active





 Sodium Chloride 0.9% [Normal Saline] 49.5 ml   





 IV ASDIRECTED   


 


 Naloxone [Narcan] Med  08/12/20 13:00 Active





 0.1 mg IV ASDIRECTED PRN   


 


 Ondansetron [Zofran] Med  08/12/20 12:27 Active





 4 mg IVPUSH Q4H PRN   


 


 Pantoprazole [ProTONIX IV***] Med  08/12/20 14:00 Active





 40 mg IV Q24H   


 


 Ropivacaine [Naropin 0.5%] 60 ml Med  08/13/20 11:00 Active





 dexAMETHasone [Dexamethasone] 8 mg   





 EPINEPHrine [Adrenalin] 0.4 mg   





 Sodium Chloride 0.9% [Normal Saline] 17.6 ml   





 NERVRT ASDIRECTED   


 


 fentaNYL/Normal Saline [fentaNYL in NS 20 MCG/ML 30 ML Med  08/12/20 13:00 

Active





 PCA]   





 0 mcg IV ASDIRECTED PRN   


 


 SCD [Sequential Compression Device] [OM.PC] Routine Oth  08/12/20 12:23 Ordered








                                Medication Orders





Ropivacaine 60 ml/Dexamethasone 8 mg/Epinephrine HCl 0.4 mg/ Sodium Chloride 

17.6 ml  0 ml NERVRT ASDIRECTED SHREYA


Fentanyl Citrate (Fentanyl In Ns 20 Mcg/Ml 30 Ml Pca)  0 mcg IV ASDIRECTED PRN; 

Protocol


   PRN Reason: Pain


   Last Admin: 08/12/20 13:21  Dose: 600 mcg


   Documented by: LARRY


Dextrose/Lactated Ringer's (Dextrose 5%-Lactated Ringers)  1,000 mls @ 100 

mls/hr IV ASDIRECTED [facility]


   Last Admin: 08/13/20 05:06  Dose: 100 mls/hr


   Documented by: CHEYANNE


   Infusion: 08/13/20 04:18  Dose: 100 mls/hr


   Documented by: CHEYANNE


   Admin: 08/12/20 18:18  Dose: 100 mls/hr


   Documented by: LARRY


Ampicillin Sodium/Sulbactam (Sodium 3 gm/ Sodium Chloride)  100 mls @ 200 mls/hr

IV Q6H [facility]


   Last Admin: 08/13/20 00:40  Dose: 200 mls/hr


   Documented by: CHEYANNE


   Admin: 08/12/20 18:16  Dose: 200 mls/hr


   Documented by: LARRY


   Admin: 08/12/20 13:13  Dose: 200 mls/hr


   Documented by: LARRY


Aztreonam 1 gm/ Sodium (Chloride)  50 mls @ 100 mls/hr IV Q8H [facility]


   Last Admin: 08/13/20 05:06  Dose: 100 mls/hr


   Documented by: CHEYANNE


   Admin: 08/12/20 21:16  Dose: 100 mls/hr


   Documented by: CHEYANNE


   Admin: 08/12/20 14:15  Dose: 100 mls/hr


   Documented by: LARRY


Ketamine HCl 50 mg/ Sodium (Chloride)  50 mls @ 15.7 mls/hr IV ASDIRECTED [facility]


Ketamine HCl (Ketalar)  26 mg IV ASDIRECTED SHREYA


Naloxone HCl (Narcan)  0.1 mg IV ASDIRECTED PRN


   PRN Reason: decreased respiratory rate


Ondansetron HCl (Zofran)  4 mg IVPUSH Q4H PRN


   PRN Reason: Nausea


Pantoprazole Sodium (Protonix Iv***)  40 mg IV Q24H [facility]


   Last Admin: 08/12/20 14:15  Dose: 40 mg


   Documented by: LARRY








Assessment/Plan Comment:: 





Assessment: 


Acute Cholecystitis and Cholelithiasis 





Plan: 


After preoperative evaluation, discussion of possible risks and benefits patient

wishes to proceed with surgical procedure. 


Discussed post op pain, hospital stay and post op restrictions when discharged 

plus when she can return to work. 





Patient cleared for General Anesthesia. 





Plan discharge in  





Jeny HOFFMANN


8/13/2020





- Mortality Measure


Prognosis:: Good

## 2020-08-14 RX ADMIN — HYDROCODONE BITARTRATE AND ACETAMINOPHEN PRN TAB: 5; 325 TABLET ORAL at 09:20

## 2020-08-14 RX ADMIN — HYDROCODONE BITARTRATE AND ACETAMINOPHEN PRN TAB: 5; 325 TABLET ORAL at 17:16

## 2020-08-14 RX ADMIN — AMPICILLIN SODIUM AND SULBACTAM SODIUM SCH MLS/HR: 2; 1 INJECTION, POWDER, FOR SOLUTION INTRAMUSCULAR; INTRAVENOUS at 01:02

## 2020-08-14 RX ADMIN — HYDROCODONE BITARTRATE AND ACETAMINOPHEN PRN TAB: 5; 325 TABLET ORAL at 05:39

## 2020-08-14 RX ADMIN — HYDROCODONE BITARTRATE AND ACETAMINOPHEN PRN TAB: 5; 325 TABLET ORAL at 21:46

## 2020-08-14 RX ADMIN — AMPICILLIN SODIUM AND SULBACTAM SODIUM SCH MLS/HR: 2; 1 INJECTION, POWDER, FOR SOLUTION INTRAMUSCULAR; INTRAVENOUS at 19:28

## 2020-08-14 RX ADMIN — HYDROCODONE BITARTRATE AND ACETAMINOPHEN PRN TAB: 5; 325 TABLET ORAL at 01:02

## 2020-08-14 RX ADMIN — AMPICILLIN SODIUM AND SULBACTAM SODIUM SCH MLS/HR: 2; 1 INJECTION, POWDER, FOR SOLUTION INTRAMUSCULAR; INTRAVENOUS at 13:29

## 2020-08-14 RX ADMIN — HYDROCODONE BITARTRATE AND ACETAMINOPHEN PRN TAB: 5; 325 TABLET ORAL at 13:29

## 2020-08-14 RX ADMIN — AMPICILLIN SODIUM AND SULBACTAM SODIUM SCH MLS/HR: 2; 1 INJECTION, POWDER, FOR SOLUTION INTRAMUSCULAR; INTRAVENOUS at 09:26

## 2020-08-14 NOTE — PN
DATE OF SERVICE:  08/14/2020

 

SUBJECTIVE:  Yari is postoperative day #1 following a laparoscopic cholecystectomy.  She

has had difficulty with pain control.  Vital signs have been stable.  She has been up to

ambulate.

 

Remainder of review of systems negative for any pertinent positives and negatives.

 

OBJECTIVE:  GENERAL:  Yari Laureano is a pleasant 40-year-old female.

VITAL SIGNS:  TPR last checked 08/14/2020 at 0218; 99.6, 53, 16. Blood pressure 108/50.

HEENT: Negative.

NECK: Supple.

HEART: Regular rate and rhythm.

LUNGS:  Clear.

ABDOMEN: Dressings dry and intact.  ADRI drain is draining a light pink drainage, total of 20

mL in the past 24 hours.

EXTREMITIES: SCDs are on, and there is no peripheral edema.

 

ASSESSMENT:  Diagnostic laparoscopy with lysis of adhesions.

1. Cholecystectomy.

2. Repair of area deserosalization of the duodenum.

 

POSTOPERATIVE DIAGNOSES:

1. Subacute cholecystitis and cholelithiasis.

2. Gallbladder neck adherent to duodenum with focal deserosalization.

 

Date of Surgery: 08/13/2020.  Surgeon:  Leonel Pollard MD.

 

PLAN:

1. Discontinue ADRI drain.

2. Colace 100 mg b.i.d. p.o.

3. Dulcolax 10 mg tablets p.o. b.i.d.

4. Dressing off, may shower.

5. We will plan discharge in a.m. The patient is having poor oral intake and inadequate

    pain control to be discharged today.

6. We will evaluate p.r.n. or in a.m.

 

 

 

 

Jeny Taylor PA-C

DD:  08/14/2020 07:51:13

DT:  08/14/2020 08:54:42

Job #:  [id number]

## 2020-08-15 RX ADMIN — HYDROCODONE BITARTRATE AND ACETAMINOPHEN PRN TAB: 5; 325 TABLET ORAL at 03:10

## 2020-08-15 RX ADMIN — AMPICILLIN SODIUM AND SULBACTAM SODIUM SCH MLS/HR: 2; 1 INJECTION, POWDER, FOR SOLUTION INTRAMUSCULAR; INTRAVENOUS at 00:27

## 2020-08-15 RX ADMIN — AMPICILLIN SODIUM AND SULBACTAM SODIUM SCH MLS/HR: 2; 1 INJECTION, POWDER, FOR SOLUTION INTRAMUSCULAR; INTRAVENOUS at 06:28

## 2020-08-15 RX ADMIN — HYDROCODONE BITARTRATE AND ACETAMINOPHEN PRN TAB: 5; 325 TABLET ORAL at 07:21

## 2020-08-16 NOTE — DISCH
FINAL DIAGNOSES:

1. Subacute and chronic cholecystitis and cholelithiasis.

2. Marked pain, nausea, and associated dehydration.

3. Postoperative persistent pain and poor oral intake.

 

OPERATIVE PROCEDURES:  Done on 08/13:

1. Diagnostic laparoscopy with lysis of adhesions:

    a.     Cholecystectomy.

    b.     Repair of area of deserosalization of the duodenum adherent to gallbladder neck.

 

SUMMARY:  This is a 40-year-old presenting to the emergency room earlier with a picture of

an acute cholecystitis.  She was initially treated with outpatient pain medication,

presented to the clinic with quite severe pain and dehydration along with significant

nausea.  Given this, she was admitted for rehydration and pain control.  Following day, the

patient underwent a cholecystectomy.  The gallbladder was noted to be adherent to the neck

of the duodenum which resulted in its deserosalization and need for repair of that with

sutures, some fibrin sealant along with omental patch.  Postoperatively, she was slow in

terms of restoring adequate oral intake as well as adequate pain control.  She was kept 1

extra day.  At this point, she is tolerating a solid diet and pain control appeared to be

satisfactory.  She will be discharged home on her usual medications plus Norco 5/325, 1 to 2

tablets q.6 hours p.r.n. pain.  Following up with Jeny Taylor at [facility] on

08/21/2020.

## 2020-08-16 NOTE — OR
DATE OF PROCEDURE:  08/13/2020

 

SURGEON:  Leonel Pollard MD

 

PREOPERATIVE DIAGNOSIS:  Subacute cholecystitis and cholelithiasis.

 

POSTOPERATIVE DIAGNOSIS:  Subacute cholecystitis and cholelithiasis with gallbladder neck

adherent to duodenum with focal deserosalization of duodenum following subsequent

dissection.

 

OPERATIVE PROCEDURES:  Diagnostic laparoscopy with lysis of adhesions and:

1. Cholecystectomy (65125).

2. Repair of deserosalization of duodenum (96036).

 

ANESTHESIA:  General.

 

ASSISTANT:  Jeny Taylor PA-C

 

INDICATIONS FOR PROCEDURE:  This is a 40-year-old female presenting with ongoing pain

related to an ongoing cholecystitis.  She was seen in the emergency room yesterday with

ongoing pain, nausea, and dehydration, and was admitted for IV hydration.  Plan is to

proceed with a cholecystectomy at this time.  Potential risks including bleeding and injury

to the common bile duct or other adjacent viscera, possible persistent symptoms

postoperatively were all reviewed, and the patient wishes to proceed.

 

DETAILS OF PROCEDURE:  The patient was taken to the operating room, placed in a supine

position.  After general endotracheal anesthesia was induced, the abdomen was prepped and

draped.  The patient had previous periumbilical incision making some adhesions in that area

a likelihood.  Given this, a small transverse incision just to the right of the umbilicus

was made, carried down through the skin and subcutaneous tissue.  Peritoneal cavity entered

under direct vision with an Optiview trocar.  Peritoneal cavity was inflated with 15 mmHg

pressure with CO2.  Laparoscope was reinserted.  No underlying trocar insertion site

injuries were seen.  Following this, a 12 mm epigastric trocar was placed along with 5 mm

right abdominal trocar and the upper abdomen examined.

 

The patient had quite a bit of dense adhesions between the omentum and the gallbladder.

These were initially taken down with Harmonic Scalpel.  Underlying gallbladder was edematous

and gray-white color consistent with chronic subacute cholecystitis.  Gallbladder was then

retracted anteriorly and laterally.  Dissection then began on the area of the gallbladder

neck.  The duodenum was densely adherent to this area and was dissected free.  Roughly a

thumbprint area of duodenum was noted to be deserosalized.  Following this, then the cystic

duct gallbladder neck junction was identified.  The cystic artery appeared to be somewhat

adherent to what would likely be the common hepatic duct.  This was dissected free, and once

that area was well delineated, both structures had 3 clips placed proximally and 1 distally

and divided.  The gallbladder was then dissected off the gallbladder bed using the Harmonic

scalpel and delivered through the epigastric trocar site and noted to contain multiple

stones.

 

Area of this deserosalization was then addressed with a series of 3 3-0 Vicryl seromuscular

stitches being placed over the duodenum with a transverse orientation.  This was then

reinforced with fibrin sealant and the omentum then placed over that area while the fibrin

sealant was setting up, thus fixing the omentum in that position.  A Jean Marie-Coleman drain was

taken out the right lateral trocar site, placed into the area of the gallbladder bed, and

with no further problems noted, trocars were removed and the peritoneal cavity deflated.

The fascia at 12 mm site was closed with 0 Vicryl stitch and the skin with 4-0 Vicryl

subcuticular stitch.  The patient had received bilateral transversus abdominis plane blocks

and the incision was also anesthetized with 1% lidocaine mixed with Marcaine.  The patient

was taken to the recovery room in satisfactory condition.

 

Physician assistant, Jeny Taylor, played an essential role assisting in this case, helping

to position the patient, retract structures as needed, as well as suturing and cutting

sutures when indicated.  Her presence improved patient safety and decreased operative time.

 

 

 

 

Leonel Pollard MD

DD:  08/15/2020 14:19:16

DT:  08/16/2020 10:20:25

Job #:  [id number]

## 2020-11-06 ENCOUNTER — HOSPITAL ENCOUNTER (EMERGENCY)
Dept: HOSPITAL 11 - JP.ED | Age: 40
Discharge: HOME | End: 2020-11-06
Payer: MEDICAID

## 2020-11-06 DIAGNOSIS — Z88.5: ICD-10-CM

## 2020-11-06 DIAGNOSIS — F17.210: ICD-10-CM

## 2020-11-06 DIAGNOSIS — K02.9: ICD-10-CM

## 2020-11-06 DIAGNOSIS — K04.7: Primary | ICD-10-CM

## 2020-11-06 PROCEDURE — 99283 EMERGENCY DEPT VISIT LOW MDM: CPT

## 2020-11-06 PROCEDURE — 96372 THER/PROPH/DIAG INJ SC/IM: CPT

## 2020-11-06 NOTE — EDM.PDOC
ED HPI GENERAL MEDICAL PROBLEM





- General


Chief Complaint: General


Stated Complaint: BROKEN TOOTH RIGHT SIDE


Time Seen by Provider: 11/06/20 16:50


Source of Information: Reports: Patient


History Limitations: Reports: No Limitations





- History of Present Illness


INITIAL COMMENTS - FREE TEXT/NARRATIVE: 





pt arrived with severe pain in the rt upper molar area. She has mild swelling on

her face. 


Onset: Today, Sudden


Duration: Hour(s):


Location: Reports: Face


Associated Symptoms: Reports: Headaches





- Related Data


                                    Allergies











Allergy/AdvReac Type Severity Reaction Status Date / Time


 


hydromorphone [From Dilaudid] Allergy Intermediate Shortness Verified 11/06/20 

15:12





   of Breath  











Home Meds: 


                                    Home Meds





hydrOXYzine HCL [hydrOXYzine] 25 mg PO Q6H PRN 06/28/20 [History]











Past Medical History


Gastrointestinal History: Reports: GERD, Hiatal Hernia


Psychiatric History: Reports: Anxiety





- Infectious Disease History


Infectious Disease History: Reports: Chicken Pox





- Past Surgical History


HEENT Surgical History: Reports: Tonsillectomy


GI Surgical History: Reports: None, Cholecystectomy





Social & Family History





- Family History


Family Medical History: Noncontributory





- Tobacco Use


Tobacco Use Status *Q: Current Every Day Tobacco User


Years of Tobacco use: 20


Packs/Tins Daily: 0.3





- Caffeine Use


Caffeine Use: Reports: None





- Recreational Drug Use


Recreational Drug Use: No





ED ROS GENERAL





- Review of Systems


Review Of Systems: See Below


Constitutional: Reports: Fever, Chills, Malaise


HEENT: Reports: Dental Pain


Respiratory: Reports: No Symptoms


Cardiovascular: Reports: No Symptoms


Endocrine: Reports: No Symptoms


GI/Abdominal: Reports: No Symptoms





ED EXAM, GENERAL





- Physical Exam


Exam: See Below


Free Text/Narrative:: 





pt has a very carious tooth in the rt upper molar area. This keeps breaking and 

it did break further. 


Exam Limited By: No Limitations


General Appearance: Alert, Anxious


Ears: Normal TMs


Nose: Normal Inspection


Throat/Mouth: Other (pt has mild facial swelling. sHe has a very carrious tooth 

in the rt upper molar area. )


Head: Atraumatic





Course





- Vital Signs


Last Recorded V/S: 


                                Last Vital Signs











Temp  36.8 C   11/06/20 15:09


 


Pulse  94   11/06/20 15:09


 


Resp  16   11/06/20 15:09


 


BP  148/93 H  11/06/20 15:09


 


Pulse Ox  98   11/06/20 15:09














- Orders/Labs/Meds


Meds: 


Medications














Discontinued Medications














Generic Name Dose Route Start Last Admin





  Trade Name Lorenzo  PRN Reason Stop Dose Admin


 


Hydrocodone Bitart/Acetaminophen  1 tab  11/06/20 16:49  11/06/20 16:58





  Norco 325-5 Mg  PO  11/06/20 16:50  1 tab





  ONETIME ONE   Administration


 


Ketorolac Tromethamine  60 mg  11/06/20 16:49  11/06/20 16:59





  Toradol  IM  11/06/20 16:50  60 mg





  ONETIME ONE   Administration














- Re-Assessments/Exams


Free Text/Narrative Re-Assessment/Exam: 





11/06/20 16:57


pt was given torodol 60 mg and norco 5/325. 





Departure





- Departure


Time of Disposition: 16:50


Disposition: Home, Self-Care 01


Condition: Fair


Clinical Impression: 


 Infected tooth








- Discharge Information


Referrals: 


PCP,None [Primary Care Provider] - 


Forms:  ED Department Discharge


Care Plan Goals: 


amoxicillin 500mg tid, norco 5/325 q6h prn for severe pain,  appt at [facility] for Monday.  motrin 600mg tid  for pain. 





Sepsis Event Note (ED)





- Evaluation


Sepsis Screening Result: No Definite Risk





- Focused Exam


Vital Signs: 


                                   Vital Signs











  Temp Pulse Resp BP Pulse Ox


 


 11/06/20 15:09  36.8 C  94  16  148/93 H  98

## 2021-07-10 ENCOUNTER — HOSPITAL ENCOUNTER (EMERGENCY)
Dept: HOSPITAL 11 - JP.ED | Age: 41
Discharge: HOME | End: 2021-07-10
Payer: MEDICAID

## 2021-07-10 DIAGNOSIS — R06.02: ICD-10-CM

## 2021-07-10 DIAGNOSIS — E66.9: ICD-10-CM

## 2021-07-10 DIAGNOSIS — Z88.5: ICD-10-CM

## 2021-07-10 DIAGNOSIS — J45.909: ICD-10-CM

## 2021-07-10 DIAGNOSIS — Z72.0: ICD-10-CM

## 2021-07-10 DIAGNOSIS — F41.9: Primary | ICD-10-CM

## 2021-07-10 PROCEDURE — 99284 EMERGENCY DEPT VISIT MOD MDM: CPT

## 2021-07-10 PROCEDURE — 71046 X-RAY EXAM CHEST 2 VIEWS: CPT

## 2021-07-10 NOTE — EDM.PDOC
ED HPI GENERAL MEDICAL PROBLEM





- General


Chief Complaint: Respiratory Problem


Stated Complaint: CHEST PAIN, SOB


Time Seen by Provider: 07/10/21 14:30


Source of Information: Reports: Patient, Family


History Limitations: Reports: No Limitations





- History of Present Illness


INITIAL COMMENTS - FREE TEXT/NARRATIVE: 





41-year-old female with a long history of anxiety, has felt short of breath for 

the past 12 hours.  She slept well last night but did take some hydroxyzine, 

this morning she still has some light chest pressure and feels she cannot get a 

deep breath so wanted to be checked.  No fevers or chills, no significant cough,

no nausea or vomiting.


Onset: Gradual


Duration: Hour(s): (Symptoms for the past 14 hours)


Location: Reports: Chest (Mild chest pressure)


Quality: Reports: Pressure.  Denies: Sharp


Associated Symptoms: Reports: Other (Patient is not short of breath but she 

feels like she cannot get a deep breath)


  ** Anterior Chest


Pain Score (Numeric/FACES): 6





- Related Data


                                    Allergies











Allergy/AdvReac Type Severity Reaction Status Date / Time


 


hydromorphone [From Dilaudid] Allergy Intermediate Shortness Verified 07/10/21 

14:15





   of Breath  











Home Meds: 


                                    Home Meds





hydrOXYzine HCL [Atarax] 25 mg PO Q6H PRN 07/10/21 [History]











Past Medical History


Respiratory History: Reports: Asthma, Other (See Below)


Other Respiratory History: asthma as young child


Gastrointestinal History: Reports: GERD, Hiatal Hernia


Psychiatric History: Reports: Anxiety


Endocrine/Metabolic History: Reports: Obesity/BMI 30+





- Infectious Disease History


Infectious Disease History: Reports: Chicken Pox





- Past Surgical History


HEENT Surgical History: Reports: Tonsillectomy


GI Surgical History: Reports: None, Cholecystectomy





Social & Family History





- Family History


Family Medical History: No Pertinent Family History





- Tobacco Use


Tobacco Use Status *Q: Current Every Day Tobacco User


Years of Tobacco use: 20


Packs/Tins Daily: 0.5


Used Tobacco, but Quit: No


Second Hand Smoke Exposure: Yes





- Caffeine Use


Caffeine Use: Reports: None





- Recreational Drug Use


Recreational Drug Use: No





ED ROS GENERAL





- Review of Systems


Review Of Systems: See Below


Constitutional: Denies: Fever, Chills


HEENT: Reports: No Symptoms


Cardiovascular: Reports: Chest Pain (Pressure persistent)


GI/Abdominal: Reports: No Symptoms


: Reports: No Symptoms


Musculoskeletal: Reports: No Symptoms


Skin: Reports: No Symptoms


Neurological: Reports: No Symptoms


Psychiatric: Reports: Anxiety





ED EXAM, GENERAL





- Physical Exam


Exam: See Below


Exam Limited By: No Limitations


General Appearance: Alert, Anxious


Eye Exam: Bilateral Eye: Normal Inspection


Head: Atraumatic


Respiratory/Chest: No Respiratory Distress, Lungs Clear, Chest Non-Tender


Cardiovascular: Regular Rate, Rhythm.  No: Tachycardia


GI/Abdominal: Soft, Non-Tender


Extremities: Normal Inspection.  No: Pedal Edema


Neurological: Alert, Oriented


Psychiatric: Anxious


Skin Exam: Warm, Dry





Course





- Vital Signs


Last Recorded V/S: 


                                Last Vital Signs











Temp  98.5 F   07/10/21 14:22


 


Pulse  71   07/10/21 14:22


 


Resp  17   07/10/21 14:22


 


BP  132/69   07/10/21 14:22


 


Pulse Ox  97   07/10/21 14:22














- Orders/Labs/Meds


Orders: 


                               Active Orders 24 hr











 Category Date Time Status


 


 Chest 2V [CR] Routine Exams  07/10/21 14:36 Taken











Meds: 


Medications














Discontinued Medications














Generic Name Dose Route Start Last Admin





  Trade Name Lorenzo  PRN Reason Stop Dose Admin


 


Lorazepam  1 mg  07/10/21 15:10  07/10/21 15:14





  Lorazepam 1 Mg Tab  PO  07/10/21 15:11  1 mg





  ONETIME ONE   Administration














- Re-Assessments/Exams


Free Text/Narrative Re-Assessment/Exam: 





07/10/21 16:26


A 2 view chest x-ray was completely normal.  O2 saturations 100% and the rest of

 her vitals were fine.  Patient was given 1 mg of oral Ativan, and will return 

in the next 24 to 48 hours if not improving.  Continue her regular medications.





Departure





- Departure


Time of Disposition: 15:27


Disposition: Home, Self-Care 01


Clinical Impression: 


 Anxiety about health





Dyspnea


Qualifiers:


 Dyspnea type: shortness of breath Qualified Code(s): R06.02 - Shortness of 

breath








- Discharge Information


Instructions:  Shortness of Breath, Adult, Easy-to-Read


Referrals: 


Dayana Ryan PA-C [Primary Care Provider] - 


Forms:  ED Department Discharge


Care Plan Goals: 


Try to decrease smoking, rest today, increase activity as tolerated and return 

anytime if worsening such as fever or increased shortness of breath.





Sepsis Event Note (ED)





- Evaluation


Sepsis Screening Result: No Definite Risk





- Focused Exam


Vital Signs: 


                                   Vital Signs











  Temp Pulse Resp BP Pulse Ox


 


 07/10/21 14:22  98.5 F  71  17  132/69  97


 


 07/10/21 14:13  98.5 F  71  17  132/69  97














- My Orders


Last 24 Hours: 


My Active Orders





07/10/21 14:36


Chest 2V [CR] Routine 














- Assessment/Plan


Last 24 Hours: 


My Active Orders





07/10/21 14:36


Chest 2V [CR] Routine

## 2022-04-19 ENCOUNTER — HOSPITAL ENCOUNTER (EMERGENCY)
Dept: HOSPITAL 11 - JP.ED | Age: 42
Discharge: HOME | End: 2022-04-19
Payer: MEDICAID

## 2022-04-19 DIAGNOSIS — I10: ICD-10-CM

## 2022-04-19 DIAGNOSIS — E66.9: ICD-10-CM

## 2022-04-19 DIAGNOSIS — E06.3: ICD-10-CM

## 2022-04-19 DIAGNOSIS — F41.9: Primary | ICD-10-CM

## 2022-04-19 DIAGNOSIS — E03.8: ICD-10-CM

## 2022-04-19 DIAGNOSIS — K21.9: ICD-10-CM

## 2022-04-19 DIAGNOSIS — Z88.5: ICD-10-CM

## 2022-04-19 DIAGNOSIS — Z79.899: ICD-10-CM

## 2022-04-21 ENCOUNTER — HOSPITAL ENCOUNTER (EMERGENCY)
Dept: HOSPITAL 11 - JP.ED | Age: 42
Discharge: HOME | End: 2022-04-21
Payer: MEDICAID

## 2022-04-21 DIAGNOSIS — R51.9: Primary | ICD-10-CM

## 2022-04-21 DIAGNOSIS — E66.9: ICD-10-CM

## 2022-04-21 DIAGNOSIS — Z72.0: ICD-10-CM

## 2022-04-21 DIAGNOSIS — N92.6: ICD-10-CM

## 2022-04-21 DIAGNOSIS — E03.8: ICD-10-CM

## 2022-04-21 DIAGNOSIS — Z79.899: ICD-10-CM

## 2022-04-21 DIAGNOSIS — I10: ICD-10-CM

## 2022-04-21 DIAGNOSIS — Z88.5: ICD-10-CM

## 2022-06-16 ENCOUNTER — HOSPITAL ENCOUNTER (EMERGENCY)
Dept: HOSPITAL 11 - JP.ED | Age: 42
Discharge: HOME | End: 2022-06-16
Payer: MEDICAID

## 2022-06-16 DIAGNOSIS — Z79.899: ICD-10-CM

## 2022-06-16 DIAGNOSIS — R55: Primary | ICD-10-CM

## 2022-06-16 DIAGNOSIS — Z88.5: ICD-10-CM

## 2022-06-16 DIAGNOSIS — K21.9: ICD-10-CM

## 2022-06-16 DIAGNOSIS — F17.210: ICD-10-CM
